# Patient Record
Sex: FEMALE | Race: WHITE | NOT HISPANIC OR LATINO | Employment: UNEMPLOYED | URBAN - METROPOLITAN AREA
[De-identification: names, ages, dates, MRNs, and addresses within clinical notes are randomized per-mention and may not be internally consistent; named-entity substitution may affect disease eponyms.]

---

## 2024-04-17 ENCOUNTER — HOSPITAL ENCOUNTER (OUTPATIENT)
Dept: RADIOLOGY | Facility: HOSPITAL | Age: 34
Discharge: HOME | End: 2024-04-17
Payer: COMMERCIAL

## 2024-04-17 DIAGNOSIS — D32.9 BENIGN NEOPLASM OF MENINGES, UNSPECIFIED (MULTI): ICD-10-CM

## 2024-04-17 PROCEDURE — 70551 MRI BRAIN STEM W/O DYE: CPT

## 2024-04-17 PROCEDURE — 70551 MRI BRAIN STEM W/O DYE: CPT | Performed by: RADIOLOGY

## 2024-04-18 NOTE — RESULT ENCOUNTER NOTE
I spoke with the patient over the phone and informed the patient of the result.  Recommend MRI with and w/o SUHAS in 1 year for meningioma

## 2024-05-02 DIAGNOSIS — D32.9 MENINGIOMA (MULTI): ICD-10-CM

## 2024-06-24 ENCOUNTER — APPOINTMENT (OUTPATIENT)
Dept: OBSTETRICS AND GYNECOLOGY | Facility: CLINIC | Age: 34
End: 2024-06-24
Payer: COMMERCIAL

## 2024-06-24 PROBLEM — O13.9 GESTATIONAL HYPERTENSION, ANTEPARTUM (HHS-HCC): Status: RESOLVED | Noted: 2024-06-24 | Resolved: 2024-06-24

## 2024-06-24 PROBLEM — G40.109 LOCALIZATION-RELATED EPILEPSY (MULTI): Status: ACTIVE | Noted: 2024-06-24

## 2024-06-24 PROBLEM — N91.5 OLIGOMENORRHEA: Status: RESOLVED | Noted: 2024-06-24 | Resolved: 2024-06-24

## 2024-06-24 PROBLEM — I26.99 OTHER PULMONARY EMBOLISM WITHOUT ACUTE COR PULMONALE (MULTI): Status: RESOLVED | Noted: 2022-01-05 | Resolved: 2024-06-24

## 2024-06-24 PROBLEM — D32.9 MENINGIOMA (MULTI): Status: RESOLVED | Noted: 2024-06-24 | Resolved: 2024-06-24

## 2024-06-24 PROBLEM — R56.9 SEIZURE (MULTI): Status: RESOLVED | Noted: 2024-06-24 | Resolved: 2024-06-24

## 2024-06-24 PROBLEM — O24.419 GESTATIONAL DIABETES MELLITUS (GDM), ANTEPARTUM (HHS-HCC): Status: RESOLVED | Noted: 2024-06-24 | Resolved: 2024-06-24

## 2024-06-24 PROBLEM — Z98.890 HX OF CRANIOTOMY: Status: ACTIVE | Noted: 2024-06-24

## 2024-06-24 PROBLEM — D49.7 NEOPLASM OF MENINGES: Status: RESOLVED | Noted: 2024-06-24 | Resolved: 2024-06-24

## 2024-06-24 PROBLEM — G93.89 BRAIN MASS: Status: RESOLVED | Noted: 2024-06-24 | Resolved: 2024-06-24

## 2024-06-25 ENCOUNTER — APPOINTMENT (OUTPATIENT)
Dept: OBSTETRICS AND GYNECOLOGY | Facility: CLINIC | Age: 34
End: 2024-06-25
Payer: COMMERCIAL

## 2024-08-07 ENCOUNTER — APPOINTMENT (OUTPATIENT)
Dept: OBSTETRICS AND GYNECOLOGY | Facility: CLINIC | Age: 34
End: 2024-08-07
Payer: COMMERCIAL

## 2024-08-21 ENCOUNTER — APPOINTMENT (OUTPATIENT)
Dept: OBSTETRICS AND GYNECOLOGY | Facility: CLINIC | Age: 34
End: 2024-08-21

## 2024-09-18 ENCOUNTER — APPOINTMENT (OUTPATIENT)
Dept: OBSTETRICS AND GYNECOLOGY | Facility: CLINIC | Age: 34
End: 2024-09-18
Payer: COMMERCIAL

## 2024-09-18 VITALS
SYSTOLIC BLOOD PRESSURE: 128 MMHG | BODY MASS INDEX: 35.84 KG/M2 | WEIGHT: 223 LBS | HEIGHT: 66 IN | DIASTOLIC BLOOD PRESSURE: 80 MMHG

## 2024-09-18 DIAGNOSIS — Z01.419 WELL WOMAN EXAM WITH ROUTINE GYNECOLOGICAL EXAM: Primary | ICD-10-CM

## 2024-09-18 DIAGNOSIS — N63.11 MASS OF UPPER OUTER QUADRANT OF RIGHT BREAST: ICD-10-CM

## 2024-09-18 PROCEDURE — 87624 HPV HI-RISK TYP POOLED RSLT: CPT

## 2024-09-18 PROCEDURE — 88141 CYTOPATH C/V INTERPRET: CPT | Performed by: PATHOLOGY

## 2024-09-18 PROCEDURE — 1036F TOBACCO NON-USER: CPT | Performed by: OBSTETRICS & GYNECOLOGY

## 2024-09-18 PROCEDURE — 99395 PREV VISIT EST AGE 18-39: CPT | Performed by: OBSTETRICS & GYNECOLOGY

## 2024-09-18 PROCEDURE — 88175 CYTOPATH C/V AUTO FLUID REDO: CPT

## 2024-09-18 PROCEDURE — 3008F BODY MASS INDEX DOCD: CPT | Performed by: OBSTETRICS & GYNECOLOGY

## 2024-09-18 NOTE — PROGRESS NOTES
Chief Complaint   Patient presents with    Annual Exam     Annual Exam with Pap Smear         C/O small marble size lump under right breast x1 month, denies pain.    Chaperone declined.       Patient presents for annual preventative exam.    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  She is currently 2 to 3 days before her next menstrual cycle.  No skin changes, nipple discharge.  Has had no pain with this but is mildly tender the last few days as is in the luteal phase.    H/o Meningioma    REVIEW OF SYSTEMS    Please see HPI for reported pertinent positives, which would supersede this ROS    Constitutional:  Denies fever, chills, wt gain or loss, fatigue    Genito-Urinary:  Denies genital lesion or sores, vaginal dryness, itching  or pain.  No abnormal vaginal discharge or unexplained vaginal bleeding.  No dysuria, urinary incontinence or frequency.  Denies pelvic pain, dysmenorrhea or dyspareunia.    Eyes:  Denies vision changes, dryness  ENT:  No hearing loss, sinus pain or congestion, nosebleeds  Cardiovascular:  No chest pain or palpitations  Respiratory:  No SOB, cough, wheezing  GI:  No Nausea, vomiting, diarrhea, constipation, abdominal pain  Musculoskeletal:  No new back pain. joint pain, peripheral edema  Skin:  No rash or skin lesion  Neurologic:  No HA, numbness or dizziness  Psychiatric:  No new anxiety or depression  Endocrine:  No loss of hair or hirsutism  Hematologic/lymphatic:  No swollen lymph nodes.  No reported tendency for easy bruising or bleeding    Patient admits to no other systemic complaints      Vitals:    24 0809   BP: 128/80       PHYSICAL EXAM:    PSYCH:  Pt is alert, oriented and cooperative    SKIN: warm, dry, w/o lesion    HEAD AND FACE:  External inspection of eyes, ears, functional cranial nerves  normal and intact    THYROID:  No thyromegaly    CARDIOVASCULAR:  Warm and well Perfused    PULMONARY:  No respiratory distress    ABDOMEN:  soft, nontender.  No mass or organomegaly.      BREAST:  Breasts are symmetric to inspection    There is no axillary lymphadenopathy.  To my clinical exam there is bilateral glandular elements in the upper outer quadrant more prominent on the right than the left.  As patient points out her area of concern, this feels like prominent glandular elements.  This area feels like a 1.5 cm area of condensed breast gland tissue.  Not a discrete mass but patient feels this is a change.    PELVIC:    External genitalia, urethra, perianal region normal to inspection and palpation if indicated.  No inguinal LA    Vagina without lesions.    Cervix seen and visually normal      Bimanual exam:      No pelvic mass palpable    Uterus nontender, midline in pelvis    No adnexal masses or tenderness    Assessment/Plan    Diagnoses and all orders for this visit:  Well woman exam with routine gynecological exam  -     THINPREP PAP TEST  Mass of upper outer quadrant of right breast -discussed evaluation with patient.  Overall, low index of suspicion.  Will proceed with imaging.  As has never had breast imaging we will proceed with bilateral diagnostic mammogram and right breast ultrasound as needed.  -     BI US breast limited right; Future  -     BI mammo bilateral diagnostic; Future

## 2024-10-03 ENCOUNTER — HOSPITAL ENCOUNTER (OUTPATIENT)
Dept: RADIOLOGY | Facility: CLINIC | Age: 34
Discharge: HOME | End: 2024-10-03
Payer: COMMERCIAL

## 2024-10-03 VITALS — BODY MASS INDEX: 35.79 KG/M2 | WEIGHT: 222.66 LBS | HEIGHT: 66 IN

## 2024-10-03 DIAGNOSIS — N63.11 MASS OF UPPER OUTER QUADRANT OF RIGHT BREAST: ICD-10-CM

## 2024-10-03 PROCEDURE — 76981 USE PARENCHYMA: CPT | Mod: RT

## 2024-10-03 PROCEDURE — 76642 ULTRASOUND BREAST LIMITED: CPT | Mod: RT

## 2024-10-03 PROCEDURE — 77062 BREAST TOMOSYNTHESIS BI: CPT

## 2025-05-28 DIAGNOSIS — D32.9 MENINGIOMA (MULTI): ICD-10-CM

## 2025-06-13 ENCOUNTER — APPOINTMENT (OUTPATIENT)
Dept: RADIOLOGY | Facility: CLINIC | Age: 35
End: 2025-06-13
Payer: COMMERCIAL

## 2025-06-13 VITALS — WEIGHT: 240 LBS | BODY MASS INDEX: 38.76 KG/M2

## 2025-06-13 DIAGNOSIS — D32.9 MENINGIOMA (MULTI): ICD-10-CM

## 2025-06-13 PROCEDURE — 2550000001 HC RX 255 CONTRASTS: Performed by: NEUROLOGICAL SURGERY

## 2025-06-13 PROCEDURE — A9575 INJ GADOTERATE MEGLUMI 0.1ML: HCPCS | Performed by: NEUROLOGICAL SURGERY

## 2025-06-13 PROCEDURE — 70553 MRI BRAIN STEM W/O & W/DYE: CPT

## 2025-06-13 RX ORDER — GADOTERATE MEGLUMINE 376.9 MG/ML
20 INJECTION INTRAVENOUS
Status: COMPLETED | OUTPATIENT
Start: 2025-06-13 | End: 2025-06-13

## 2025-06-13 RX ADMIN — GADOTERATE MEGLUMINE 20 ML: 376.9 INJECTION INTRAVENOUS at 10:51
